# Patient Record
Sex: MALE | Race: ASIAN | URBAN - METROPOLITAN AREA
[De-identification: names, ages, dates, MRNs, and addresses within clinical notes are randomized per-mention and may not be internally consistent; named-entity substitution may affect disease eponyms.]

---

## 2020-07-13 ENCOUNTER — OFFICE VISIT (OUTPATIENT)
Dept: PRIMARY CARE CLINIC | Age: 40
End: 2020-07-13
Payer: COMMERCIAL

## 2020-07-13 PROCEDURE — G8428 CUR MEDS NOT DOCUMENT: HCPCS | Performed by: NURSE PRACTITIONER

## 2020-07-13 PROCEDURE — G8421 BMI NOT CALCULATED: HCPCS | Performed by: NURSE PRACTITIONER

## 2020-07-13 PROCEDURE — 99211 OFF/OP EST MAY X REQ PHY/QHP: CPT | Performed by: NURSE PRACTITIONER

## 2020-07-13 NOTE — PROGRESS NOTES
Wayne Aguilar received a viral test for COVID-19. They were educated on isolation and quarantine as appropriate. For any symptoms, they were directed to seek care from their PCP, given contact information to establish with a doctor, directed to an urgent care or the emergency room.

## 2020-07-18 LAB
SARS-COV-2: NOT DETECTED
SOURCE: NORMAL

## 2022-01-28 LAB — DIABETIC RETINOPATHY: NEGATIVE

## 2022-05-24 ENCOUNTER — OFFICE VISIT (OUTPATIENT)
Dept: PRIMARY CARE CLINIC | Age: 42
End: 2022-05-24
Payer: COMMERCIAL

## 2022-05-24 VITALS
OXYGEN SATURATION: 100 % | BODY MASS INDEX: 28.44 KG/M2 | HEIGHT: 67 IN | WEIGHT: 181.2 LBS | DIASTOLIC BLOOD PRESSURE: 86 MMHG | HEART RATE: 64 BPM | RESPIRATION RATE: 16 BRPM | SYSTOLIC BLOOD PRESSURE: 159 MMHG | TEMPERATURE: 97.1 F

## 2022-05-24 DIAGNOSIS — I10 PRIMARY HYPERTENSION: ICD-10-CM

## 2022-05-24 DIAGNOSIS — E11.9 TYPE 2 DIABETES MELLITUS WITHOUT COMPLICATION, WITHOUT LONG-TERM CURRENT USE OF INSULIN (HCC): ICD-10-CM

## 2022-05-24 DIAGNOSIS — E78.5 DYSLIPIDEMIA: ICD-10-CM

## 2022-05-24 DIAGNOSIS — E11.9 TYPE 2 DIABETES MELLITUS WITHOUT COMPLICATION, WITHOUT LONG-TERM CURRENT USE OF INSULIN (HCC): Primary | ICD-10-CM

## 2022-05-24 LAB
A/G RATIO: 1.7 (ref 1.1–2.2)
ALBUMIN SERPL-MCNC: 4.7 G/DL (ref 3.4–5)
ALP BLD-CCNC: 136 U/L (ref 40–129)
ALT SERPL-CCNC: 22 U/L (ref 10–40)
ANION GAP SERPL CALCULATED.3IONS-SCNC: 17 MMOL/L (ref 3–16)
AST SERPL-CCNC: 16 U/L (ref 15–37)
BASOPHILS ABSOLUTE: 0 K/UL (ref 0–0.2)
BASOPHILS RELATIVE PERCENT: 0.7 %
BILIRUB SERPL-MCNC: 0.6 MG/DL (ref 0–1)
BUN BLDV-MCNC: 5 MG/DL (ref 7–20)
CALCIUM SERPL-MCNC: 9.3 MG/DL (ref 8.3–10.6)
CHLORIDE BLD-SCNC: 99 MMOL/L (ref 99–110)
CHOLESTEROL, TOTAL: 145 MG/DL (ref 0–199)
CO2: 23 MMOL/L (ref 21–32)
CREAT SERPL-MCNC: 0.6 MG/DL (ref 0.9–1.3)
EOSINOPHILS ABSOLUTE: 0.1 K/UL (ref 0–0.6)
EOSINOPHILS RELATIVE PERCENT: 1.6 %
GFR AFRICAN AMERICAN: >60
GFR NON-AFRICAN AMERICAN: >60
GLUCOSE BLD-MCNC: 143 MG/DL (ref 70–99)
HCT VFR BLD CALC: 42.1 % (ref 40.5–52.5)
HDLC SERPL-MCNC: 40 MG/DL (ref 40–60)
HEMOGLOBIN: 14 G/DL (ref 13.5–17.5)
LDL CHOLESTEROL CALCULATED: 68 MG/DL
LYMPHOCYTES ABSOLUTE: 2.3 K/UL (ref 1–5.1)
LYMPHOCYTES RELATIVE PERCENT: 35.6 %
MCH RBC QN AUTO: 27.4 PG (ref 26–34)
MCHC RBC AUTO-ENTMCNC: 33.2 G/DL (ref 31–36)
MCV RBC AUTO: 82.6 FL (ref 80–100)
MONOCYTES ABSOLUTE: 0.4 K/UL (ref 0–1.3)
MONOCYTES RELATIVE PERCENT: 6.4 %
NEUTROPHILS ABSOLUTE: 3.6 K/UL (ref 1.7–7.7)
NEUTROPHILS RELATIVE PERCENT: 55.7 %
PDW BLD-RTO: 14.5 % (ref 12.4–15.4)
PLATELET # BLD: 300 K/UL (ref 135–450)
PMV BLD AUTO: 8 FL (ref 5–10.5)
POTASSIUM SERPL-SCNC: 4.6 MMOL/L (ref 3.5–5.1)
RBC # BLD: 5.09 M/UL (ref 4.2–5.9)
SODIUM BLD-SCNC: 139 MMOL/L (ref 136–145)
T4 FREE: 1.1 NG/DL (ref 0.9–1.8)
TOTAL PROTEIN: 7.5 G/DL (ref 6.4–8.2)
TRIGL SERPL-MCNC: 184 MG/DL (ref 0–150)
TSH REFLEX: 4.93 UIU/ML (ref 0.27–4.2)
VLDLC SERPL CALC-MCNC: 37 MG/DL
WBC # BLD: 6.5 K/UL (ref 4–11)

## 2022-05-24 PROCEDURE — 99203 OFFICE O/P NEW LOW 30 MIN: CPT | Performed by: FAMILY MEDICINE

## 2022-05-24 RX ORDER — ATORVASTATIN CALCIUM 10 MG/1
10 TABLET, FILM COATED ORAL DAILY
COMMUNITY
End: 2022-07-28

## 2022-05-24 RX ORDER — GLIMEPIRIDE 1 MG/1
1 TABLET ORAL 2 TIMES DAILY WITH MEALS
Qty: 60 TABLET | Refills: 2 | Status: SHIPPED | OUTPATIENT
Start: 2022-05-24 | End: 2022-07-28 | Stop reason: SDUPTHER

## 2022-05-24 RX ORDER — ATORVASTATIN CALCIUM 10 MG/1
10 TABLET, FILM COATED ORAL DAILY
Qty: 30 TABLET | Refills: 2 | Status: SHIPPED | OUTPATIENT
Start: 2022-05-24 | End: 2022-07-28 | Stop reason: SDUPTHER

## 2022-05-24 RX ORDER — TELMISARTAN 40 MG/1
40 TABLET ORAL DAILY
Qty: 30 TABLET | Refills: 2 | Status: SHIPPED | OUTPATIENT
Start: 2022-05-24 | End: 2022-07-28 | Stop reason: SDUPTHER

## 2022-05-24 RX ORDER — METOPROLOL SUCCINATE 25 MG/1
25 TABLET, EXTENDED RELEASE ORAL DAILY
Qty: 30 TABLET | Refills: 2 | Status: SHIPPED | OUTPATIENT
Start: 2022-05-24 | End: 2022-07-28 | Stop reason: SDUPTHER

## 2022-05-24 SDOH — ECONOMIC STABILITY: FOOD INSECURITY: WITHIN THE PAST 12 MONTHS, THE FOOD YOU BOUGHT JUST DIDN'T LAST AND YOU DIDN'T HAVE MONEY TO GET MORE.: NEVER TRUE

## 2022-05-24 SDOH — ECONOMIC STABILITY: FOOD INSECURITY: WITHIN THE PAST 12 MONTHS, YOU WORRIED THAT YOUR FOOD WOULD RUN OUT BEFORE YOU GOT MONEY TO BUY MORE.: NEVER TRUE

## 2022-05-24 ASSESSMENT — ENCOUNTER SYMPTOMS
VOMITING: 0
SORE THROAT: 0
ABDOMINAL PAIN: 0
BLOOD IN STOOL: 0
COUGH: 0
DIARRHEA: 0
SHORTNESS OF BREATH: 0
NAUSEA: 0

## 2022-05-24 ASSESSMENT — PATIENT HEALTH QUESTIONNAIRE - PHQ9
7. TROUBLE CONCENTRATING ON THINGS, SUCH AS READING THE NEWSPAPER OR WATCHING TELEVISION: 0
SUM OF ALL RESPONSES TO PHQ QUESTIONS 1-9: 0
4. FEELING TIRED OR HAVING LITTLE ENERGY: 0
SUM OF ALL RESPONSES TO PHQ9 QUESTIONS 1 & 2: 0
10. IF YOU CHECKED OFF ANY PROBLEMS, HOW DIFFICULT HAVE THESE PROBLEMS MADE IT FOR YOU TO DO YOUR WORK, TAKE CARE OF THINGS AT HOME, OR GET ALONG WITH OTHER PEOPLE: 0
SUM OF ALL RESPONSES TO PHQ QUESTIONS 1-9: 0
9. THOUGHTS THAT YOU WOULD BE BETTER OFF DEAD, OR OF HURTING YOURSELF: 0
6. FEELING BAD ABOUT YOURSELF - OR THAT YOU ARE A FAILURE OR HAVE LET YOURSELF OR YOUR FAMILY DOWN: 0
SUM OF ALL RESPONSES TO PHQ QUESTIONS 1-9: 0
1. LITTLE INTEREST OR PLEASURE IN DOING THINGS: 0
SUM OF ALL RESPONSES TO PHQ QUESTIONS 1-9: 0
5. POOR APPETITE OR OVEREATING: 0
3. TROUBLE FALLING OR STAYING ASLEEP: 0
8. MOVING OR SPEAKING SO SLOWLY THAT OTHER PEOPLE COULD HAVE NOTICED. OR THE OPPOSITE, BEING SO FIGETY OR RESTLESS THAT YOU HAVE BEEN MOVING AROUND A LOT MORE THAN USUAL: 0
2. FEELING DOWN, DEPRESSED OR HOPELESS: 0

## 2022-05-24 ASSESSMENT — SOCIAL DETERMINANTS OF HEALTH (SDOH): HOW HARD IS IT FOR YOU TO PAY FOR THE VERY BASICS LIKE FOOD, HOUSING, MEDICAL CARE, AND HEATING?: NOT HARD AT ALL

## 2022-05-24 NOTE — PATIENT INSTRUCTIONS
Patient Education      Go to the ER ASAP if you develop any chest pain, shortness of breath, facial droop, slurred speech, weakness/numbness in your arms or legs or double vision! DASH Diet: Care Instructions  Your Care Instructions     The DASH diet is an eating plan that can help lower your blood pressure. DASH stands for Dietary Approaches to Stop Hypertension. Hypertension is high bloodpressure. The DASH diet focuses on eating foods that are high in calcium, potassium, and magnesium. These nutrients can lower blood pressure. The foods that are highest in these nutrients are fruits, vegetables, low-fat dairy products, nuts, seeds, and legumes. But taking calcium, potassium, and magnesium supplements instead of eating foods that are high in those nutrients does not have the same effect. The DASH diet also includes whole grains, fish, and poultry. The DASH diet is one of several lifestyle changes your doctor may recommend to lower your high blood pressure. Your doctor may also want you to decrease the amount of sodium in your diet. Lowering sodium while following the DASH dietcan lower blood pressure even further than just the DASH diet alone. Follow-up care is a key part of your treatment and safety. Be sure to make and go to all appointments, and call your doctor if you are having problems. It's also a good idea to know your test results and keep alist of the medicines you take. How can you care for yourself at home? Following the DASH diet   Eat 4 to 5 servings of fruit each day. A serving is 1 medium-sized piece of fruit, ½ cup chopped or canned fruit, 1/4 cup dried fruit, or 4 ounces (½ cup) of fruit juice. Choose fruit more often than fruit juice.  Eat 4 to 5 servings of vegetables each day. A serving is 1 cup of lettuce or raw leafy vegetables, ½ cup of chopped or cooked vegetables, or 4 ounces (½ cup) of vegetable juice. Choose vegetables more often than vegetable juice.    Get 2 to 3 servings of low-fat and fat-free dairy each day. A serving is 8 ounces of milk, 1 cup of yogurt, or 1 ½ ounces of cheese.  Eat 6 to 8 servings of grains each day. A serving is 1 slice of bread, 1 ounce of dry cereal, or ½ cup of cooked rice, pasta, or cooked cereal. Try to choose whole-grain products as much as possible.  Limit lean meat, poultry, and fish to 2 servings each day. A serving is 3 ounces, about the size of a deck of cards.  Eat 4 to 5 servings of nuts, seeds, and legumes (cooked dried beans, lentils, and split peas) each week. A serving is 1/3 cup of nuts, 2 tablespoons of seeds, or ½ cup of cooked beans or peas.  Limit fats and oils to 2 to 3 servings each day. A serving is 1 teaspoon of vegetable oil or 2 tablespoons of salad dressing.  Limit sweets and added sugars to 5 servings or less a week. A serving is 1 tablespoon jelly or jam, ½ cup sorbet, or 1 cup of lemonade.  Eat less than 2,300 milligrams (mg) of sodium a day. If you limit your sodium to 1,500 mg a day, you can lower your blood pressure even more.  Be aware that all of these are the suggested number of servings for people who eat 1,800 to 2,000 calories a day. Your recommended number of servings may be different if you need more or fewer calories. Tips for success   Start small. Do not try to make dramatic changes to your diet all at once. You might feel that you are missing out on your favorite foods and then be more likely to not follow the plan. Make small changes, and stick with them. Once those changes become habit, add a few more changes.  Try some of the following:  ? Make it a goal to eat a fruit or vegetable at every meal and at snacks. This will make it easy to get the recommended amount of fruits and vegetables each day. ? Try yogurt topped with fruit and nuts for a snack or healthy dessert. ? Add lettuce, tomato, cucumber, and onion to sandwiches.   ? Combine a ready-made pizza crust with low-fat mozzarella cheese and lots of vegetable toppings. Try using tomatoes, squash, spinach, broccoli, carrots, cauliflower, and onions. ? Have a variety of cut-up vegetables with a low-fat dip as an appetizer instead of chips and dip. ? Sprinkle sunflower seeds or chopped almonds over salads. Or try adding chopped walnuts or almonds to cooked vegetables. ? Try some vegetarian meals using beans and peas. Add garbanzo or kidney beans to salads. Make burritos and tacos with mashed dorsey beans or black beans. Where can you learn more? Go to https://Viewglasseb.[employer]. org and sign in to your [employer] account. Enter I520 in the [employer] box to learn more about \"DASH Diet: Care Instructions. \"     If you do not have an account, please click on the \"Sign Up Now\" link. Current as of: January 10, 2022               Content Version: 13.2  © 2006-2022 Healthwise, Incorporated. Care instructions adapted under license by [facility] ([facility]). If you have questions about a medical condition or this instruction, always ask your healthcare professional. [address] any warranty or liability for your use of this information.

## 2022-05-24 NOTE — PROGRESS NOTES
Chief Complaint   Patient presents with   [address]    Annual Exam         Mandi Anand is a 39 y.o. male who presents to be established. Pt just returned from a stay in [facility] and saw a physician there and had his medications adjusted as his HgBA1c level was elevated at 7.6    Patient has a past medical history significant for HTN and dyslipidemia is on toprol xl 25 mg/micardis 40 mg daily and lipitor 10 mg daily     Pt has a hx of diabetes and is on metformin 500 mg/amaryl 0.5 mg bid and farxiga 10 mg daily and reports FBS readings between 136 - 140    Pt denies any hx of CAD or asthma and denies any surgeries    Pt is a nonsmoker and denies any alcohol or illegal drug use; Pt does report recent family stress issues; Pt does eat alot of salty and spicy foods    Pt did complete his COVID vaccine series    FHx positive for early CAD (father); FHx negative for colon/prostate cancer      Review of Systems   Constitutional: Negative for fatigue and fever. HENT: Negative for nosebleeds and sore throat. Eyes:        Negative blurred vision or diplopia   Respiratory: Negative for cough and shortness of breath. Cardiovascular: Negative for chest pain, palpitations and leg swelling. Gastrointestinal: Negative for abdominal pain, blood in stool, diarrhea, nausea and vomiting. Negative melena or indigestion   Endocrine: Negative for polydipsia and polyuria. Genitourinary: Negative for dysuria and hematuria. Musculoskeletal: Negative for arthralgias. Skin: Negative for rash. Neurological: Negative for dizziness, seizures, syncope, speech difficulty, weakness and headaches. Psychiatric/Behavioral: Negative for dysphoric mood. The patient is not nervous/anxious. Vitals:    05/24/22 0755   BP: (!) 159/86   Pulse: 64   Resp: 16   Temp: 97.1 °F (36.2 °C)   SpO2: 100%     Pt did not take his BP medication yet today    Physical Exam  Vitals reviewed.    Constitutional: General: He is not in acute distress. HENT:      Mouth/Throat:      Mouth: Mucous membranes are moist.      Pharynx: Oropharynx is clear. Eyes:      General: No scleral icterus. Comments: Pink conjunctivae    Neck:      Thyroid: No thyromegaly. Comments: No carotid bruits noted B/L  Cardiovascular:      Heart sounds: Normal heart sounds. No murmur heard. No friction rub. No gallop. Pulmonary:      Effort: Pulmonary effort is normal.      Breath sounds: Normal breath sounds. Abdominal:      Palpations: Abdomen is soft. Tenderness: There is no abdominal tenderness. Musculoskeletal:      Comments: No leg edema noted B/L   Lymphadenopathy:      Cervical: No cervical adenopathy. Skin:     Findings: No rash. Neurological:      Mental Status: He is alert. Comments: Cranial nerves 2 - 12 grossly intact; Muscle strength 5/5 throughout   Psychiatric:         Mood and Affect: Mood normal.         ASSESSMENT AND PLAN    1. Type 2 diabetes mellitus without complication, without long-term current use of insulin (HCC)  Patient clinically stable on present medications and was told to follow a diabetic diet; avoiding concentrated sweets and consuming a low carbohydrate diet and will check fasting bloodwork  - CBC with Auto Differential; Future  - Comprehensive Metabolic Panel; Future  - TSH with Reflex; Future  - Lipid Panel; Future  - Hemoglobin A1C; Future  - metFORMIN (GLUCOPHAGE) 500 MG tablet; Take 1 tablet by mouth 2 times daily (with meals)  Dispense: 60 tablet; Refill: 2  - glimepiride (AMARYL) 1 MG tablet; Take 1 tablet by mouth 2 times daily (with meals)  Dispense: 60 tablet; Refill: 2    2. Primary hypertension  Pt with an elevated /86 but denies any CP or SOB and was told to follow a low sodium diet and will reevaluate pt in 6 weeks  - CBC with Auto Differential; Future  - Comprehensive Metabolic Panel; Future  - TSH with Reflex; Future  - Lipid Panel;  Future  - metoprolol succinate (TOPROL XL) 25 MG extended release tablet; Take 1 tablet by mouth daily  Dispense: 30 tablet; Refill: 2  - telmisartan (MICARDIS) 40 MG tablet; Take 1 tablet by mouth daily  Dispense: 30 tablet; Refill: 2    3. Dyslipidemia  Patient clinically stable on present medications and is with a nonfocal neuro exam  - CBC with Auto Differential; Future  - Comprehensive Metabolic Panel; Future  - TSH with Reflex; Future  - Lipid Panel; Future  - atorvastatin (LIPITOR) 10 MG tablet; Take 1 tablet by mouth daily  Dispense: 30 tablet; Refill: 2        Vee Longoria MD      Return in about 6 weeks (around 7/5/2022). Patient Instructions     Patient Education      Go to the ER ASAP if you develop any chest pain, shortness of breath, facial droop, slurred speech, weakness/numbness in your arms or legs or double vision! DASH Diet: Care Instructions  Your Care Instructions     The DASH diet is an eating plan that can help lower your blood pressure. DASH stands for Dietary Approaches to Stop Hypertension. Hypertension is high bloodpressure. The DASH diet focuses on eating foods that are high in calcium, potassium, and magnesium. These nutrients can lower blood pressure. The foods that are highest in these nutrients are fruits, vegetables, low-fat dairy products, nuts, seeds, and legumes. But taking calcium, potassium, and magnesium supplements instead of eating foods that are high in those nutrients does not have the same effect. The DASH diet also includes whole grains, fish, and poultry. The DASH diet is one of several lifestyle changes your doctor may recommend to lower your high blood pressure. Your doctor may also want you to decrease the amount of sodium in your diet. Lowering sodium while following the DASH dietcan lower blood pressure even further than just the DASH diet alone. Follow-up care is a key part of your treatment and safety.  Be sure to make and go to all appointments, and call your doctor if you are having problems. It's also a good idea to know your test results and keep alist of the medicines you take. How can you care for yourself at home? Following the DASH diet   Eat 4 to 5 servings of fruit each day. A serving is 1 medium-sized piece of fruit, ½ cup chopped or canned fruit, 1/4 cup dried fruit, or 4 ounces (½ cup) of fruit juice. Choose fruit more often than fruit juice.  Eat 4 to 5 servings of vegetables each day. A serving is 1 cup of lettuce or raw leafy vegetables, ½ cup of chopped or cooked vegetables, or 4 ounces (½ cup) of vegetable juice. Choose vegetables more often than vegetable juice.  Get 2 to 3 servings of low-fat and fat-free dairy each day. A serving is 8 ounces of milk, 1 cup of yogurt, or 1 ½ ounces of cheese.  Eat 6 to 8 servings of grains each day. A serving is 1 slice of bread, 1 ounce of dry cereal, or ½ cup of cooked rice, pasta, or cooked cereal. Try to choose whole-grain products as much as possible.  Limit lean meat, poultry, and fish to 2 servings each day. A serving is 3 ounces, about the size of a deck of cards.  Eat 4 to 5 servings of nuts, seeds, and legumes (cooked dried beans, lentils, and split peas) each week. A serving is 1/3 cup of nuts, 2 tablespoons of seeds, or ½ cup of cooked beans or peas.  Limit fats and oils to 2 to 3 servings each day. A serving is 1 teaspoon of vegetable oil or 2 tablespoons of salad dressing.  Limit sweets and added sugars to 5 servings or less a week. A serving is 1 tablespoon jelly or jam, ½ cup sorbet, or 1 cup of lemonade.  Eat less than 2,300 milligrams (mg) of sodium a day. If you limit your sodium to 1,500 mg a day, you can lower your blood pressure even more.  Be aware that all of these are the suggested number of servings for people who eat 1,800 to 2,000 calories a day. Your recommended number of servings may be different if you need more or fewer calories. Tips for success   Start small.  Do not try to make dramatic changes to your diet all at once. You might feel that you are missing out on your favorite foods and then be more likely to not follow the plan. Make small changes, and stick with them. Once those changes become habit, add a few more changes.  Try some of the following:  ? Make it a goal to eat a fruit or vegetable at every meal and at snacks. This will make it easy to get the recommended amount of fruits and vegetables each day. ? Try yogurt topped with fruit and nuts for a snack or healthy dessert. ? Add lettuce, tomato, cucumber, and onion to sandwiches. ? Combine a ready-made pizza crust with low-fat mozzarella cheese and lots of vegetable toppings. Try using tomatoes, squash, spinach, broccoli, carrots, cauliflower, and onions. ? Have a variety of cut-up vegetables with a low-fat dip as an appetizer instead of chips and dip. ? Sprinkle sunflower seeds or chopped almonds over salads. Or try adding chopped walnuts or almonds to cooked vegetables. ? Try some vegetarian meals using beans and peas. Add garbanzo or kidney beans to salads. Make burritos and tacos with mashed dorsey beans or black beans. Where can you learn more? Go to https://Mango HealtholiverSolulink.[employer]. org and sign in to your [employer] account. Enter N281 in the [facility] box to learn more about \"DASH Diet: Care Instructions. \"     If you do not have an account, please click on the \"Sign Up Now\" link. Current as of: January 10, 2022               Content Version: 13.2  © 8261-8342 Healthwise, [employer]. Care instructions adapted under license by [facility] ([facility]). If you have questions about a medical condition or this instruction, always ask your healthcare professional. [address] any warranty or liability for your use of this information.

## 2022-05-25 LAB
ESTIMATED AVERAGE GLUCOSE: 188.6 MG/DL
HBA1C MFR BLD: 8.2 %

## 2022-07-28 ENCOUNTER — OFFICE VISIT (OUTPATIENT)
Dept: PRIMARY CARE CLINIC | Age: 42
End: 2022-07-28
Payer: COMMERCIAL

## 2022-07-28 VITALS
WEIGHT: 179 LBS | RESPIRATION RATE: 16 BRPM | DIASTOLIC BLOOD PRESSURE: 70 MMHG | SYSTOLIC BLOOD PRESSURE: 122 MMHG | BODY MASS INDEX: 28.09 KG/M2 | TEMPERATURE: 98.6 F | HEART RATE: 81 BPM | HEIGHT: 67 IN | OXYGEN SATURATION: 97 %

## 2022-07-28 DIAGNOSIS — I10 PRIMARY HYPERTENSION: ICD-10-CM

## 2022-07-28 DIAGNOSIS — E11.9 TYPE 2 DIABETES MELLITUS WITHOUT COMPLICATION, WITHOUT LONG-TERM CURRENT USE OF INSULIN (HCC): ICD-10-CM

## 2022-07-28 DIAGNOSIS — E78.5 DYSLIPIDEMIA: ICD-10-CM

## 2022-07-28 DIAGNOSIS — R09.89 BRUIT OF LEFT CAROTID ARTERY: Primary | ICD-10-CM

## 2022-07-28 PROCEDURE — 3052F HG A1C>EQUAL 8.0%<EQUAL 9.0%: CPT | Performed by: FAMILY MEDICINE

## 2022-07-28 PROCEDURE — 99214 OFFICE O/P EST MOD 30 MIN: CPT | Performed by: FAMILY MEDICINE

## 2022-07-28 RX ORDER — TELMISARTAN 40 MG/1
40 TABLET ORAL DAILY
Qty: 30 TABLET | Refills: 2 | Status: SHIPPED | OUTPATIENT
Start: 2022-07-28 | End: 2022-11-01

## 2022-07-28 RX ORDER — METOPROLOL SUCCINATE 25 MG/1
25 TABLET, EXTENDED RELEASE ORAL DAILY
Qty: 30 TABLET | Refills: 2 | Status: SHIPPED | OUTPATIENT
Start: 2022-07-28 | End: 2022-11-01

## 2022-07-28 RX ORDER — ATORVASTATIN CALCIUM 10 MG/1
10 TABLET, FILM COATED ORAL DAILY
Qty: 30 TABLET | Refills: 2 | Status: SHIPPED | OUTPATIENT
Start: 2022-07-28 | End: 2022-11-01

## 2022-07-28 RX ORDER — GLIMEPIRIDE 1 MG/1
1 TABLET ORAL 2 TIMES DAILY WITH MEALS
Qty: 60 TABLET | Refills: 2 | Status: SHIPPED | OUTPATIENT
Start: 2022-07-28 | End: 2022-11-01

## 2022-07-28 ASSESSMENT — ENCOUNTER SYMPTOMS
VOMITING: 0
SORE THROAT: 0
NAUSEA: 0
COUGH: 0
ABDOMINAL PAIN: 0
SHORTNESS OF BREATH: 0
DIARRHEA: 0
BLOOD IN STOOL: 0

## 2022-07-28 NOTE — PROGRESS NOTES
Chief Complaint   Patient presents with    Follow-up     Patient here for follow up visit-     Back Pain     C/o lower back pain X two days         Vega William is a 43 y.o. male who presents for followup visit regarding HTN, dyslipidemia and diabetes. Pt has been following a low sodium diet and reports BP readings at home  around 130/ 80     Patient has a past medical history significant for HTN and dyslipidemia is on toprol xl 25 mg micardis 40 mg daily and lipitor 10 mg daily     Pt has a hx of diabetes and is on metformin and amaryl daily and reports FBS readings between  136 - 140  Pt did see Ophthalmology and reports a normal eye exam    Patient did recent bloodwork [CMP, CBC, TSH, lipid panel] which was within normal limits except for signs of uncontrolled diabetes with a HgBA1c 8.2 and patient thus needs to follow a stricter diabetic diet; avoiding concentrated sweets and consuming a low carbohydrate diet. Patient also is with a mildly elevated triglyceride \"fat\" level  184 and needs to follow a low fat diet. Finally patient is with a mildly elevated thyroid TSH level 4.93 (with nl free T4) and alk phos 136 which we will follow     Pt denies any hx of CAD or asthma and denies any surgeries     Pt is a nonsmoker and denies any alcohol or illegal drug use     Pt did complete his COVID vaccine series     FHx positive for early CAD (father); FHx negative for colon/prostate cancer      Review of Systems   Constitutional:  Negative for fatigue and fever. HENT:  Negative for nosebleeds and sore throat. Eyes:         Negative blurred vision or diplopia   Respiratory:  Negative for cough and shortness of breath. Cardiovascular:  Negative for chest pain, palpitations and leg swelling. Gastrointestinal:  Negative for abdominal pain, blood in stool, diarrhea, nausea and vomiting. Negative melena or indigestion   Endocrine: Negative for polydipsia and polyuria.    Genitourinary:  Negative for dysuria and hematuria. Musculoskeletal:  Negative for arthralgias. Skin:  Negative for rash. Neurological:  Negative for dizziness, seizures, syncope, speech difficulty, weakness and headaches. Psychiatric/Behavioral:  Negative for dysphoric mood. The patient is not nervous/anxious. Vitals:    07/28/22 1510   BP: 122/70   Pulse: 81   Resp: 16   Temp: 98.6 °F (37 °C)   SpO2: 97%         Physical Exam  Vitals reviewed. Constitutional:       General: He is not in acute distress. HENT:      Mouth/Throat:      Mouth: Mucous membranes are moist.      Pharynx: Oropharynx is clear. Eyes:      General: No scleral icterus. Comments: Pink conjunctivae    Neck:      Thyroid: No thyromegaly. Comments: Positive L carotid bruit noted  Cardiovascular:      Heart sounds: Normal heart sounds. No murmur heard. No friction rub. No gallop. Pulmonary:      Effort: Pulmonary effort is normal.      Breath sounds: Normal breath sounds. Abdominal:      Palpations: Abdomen is soft. Tenderness: There is no abdominal tenderness. Musculoskeletal:      Comments: No leg edema noted B/L   Lymphadenopathy:      Cervical: No cervical adenopathy. Skin:     Findings: No rash. Neurological:      Mental Status: He is alert. Comments: Cranial nerves 2 - 12 grossly intact; Muscle strength 5/5 throughout   Psychiatric:         Mood and Affect: Mood normal.       ASSESSMENT AND PLAN    1. Type 2 diabetes mellitus without complication, without long-term current use of insulin ([facility])  Patient clinically stable on present medications and was told to follow a diabetic diet; avoiding concentrated sweets and consuming a low carbohydrate diet  - metFORMIN (GLUCOPHAGE) 500 MG tablet; Take 1 tablet by mouth in the morning and 1 tablet in the evening. Take with meals. Dispense: 60 tablet; Refill: 2  - glimepiride (AMARYL) 1 MG tablet; Take 1 tablet by mouth in the morning and 1 tablet in the evening. Take with meals. Dispense: 60 tablet; Refill: 2    2. Primary hypertension  Patient clinically stable on present medications and denies any CP or SOB and had recent normal CMP bloodwork  - metoprolol succinate (TOPROL XL) 25 MG extended release tablet; Take 1 tablet by mouth in the morning. Dispense: 30 tablet; Refill: 2  - telmisartan (MICARDIS) 40 MG tablet; Take 1 tablet by mouth in the morning. Dispense: 30 tablet; Refill: 2    3. Dyslipidemia/Bruit of left carotid artery  Patient clinically stable on present medications and is with a nonfocal neuro exam and will check carotid dopplers given noted L carotid bruit on PE  - atorvastatin (LIPITOR) 10 MG tablet; Take 1 tablet by mouth in the morning. Dispense: 30 tablet; Refill: 2  - VL Extremity Venous Bilateral; Future      Georgetta Siemens, MD      Return in about 3 months (around 10/28/2022). Patient Instructions     Go to the ER ASAP if you develop any chest pain, shortness of breath, facial droop, slurred speech, weakness/numbness in your arms or legs or double vision!

## 2022-10-31 DIAGNOSIS — I10 PRIMARY HYPERTENSION: ICD-10-CM

## 2022-10-31 DIAGNOSIS — E11.9 TYPE 2 DIABETES MELLITUS WITHOUT COMPLICATION, WITHOUT LONG-TERM CURRENT USE OF INSULIN (HCC): ICD-10-CM

## 2022-10-31 DIAGNOSIS — E78.5 DYSLIPIDEMIA: ICD-10-CM

## 2022-11-01 RX ORDER — METOPROLOL SUCCINATE 25 MG/1
25 TABLET, EXTENDED RELEASE ORAL DAILY
Qty: 30 TABLET | Refills: 1 | Status: SHIPPED | OUTPATIENT
Start: 2022-11-01

## 2022-11-01 RX ORDER — TELMISARTAN 40 MG/1
40 TABLET ORAL DAILY
Qty: 30 TABLET | Refills: 1 | Status: SHIPPED | OUTPATIENT
Start: 2022-11-01

## 2022-11-01 RX ORDER — ATORVASTATIN CALCIUM 10 MG/1
10 TABLET, FILM COATED ORAL DAILY
Qty: 30 TABLET | Refills: 1 | Status: SHIPPED | OUTPATIENT
Start: 2022-11-01

## 2022-11-01 RX ORDER — GLIMEPIRIDE 1 MG/1
TABLET ORAL
Qty: 60 TABLET | Refills: 1 | Status: SHIPPED | OUTPATIENT
Start: 2022-11-01

## 2022-11-01 NOTE — TELEPHONE ENCOUNTER
Medication:   Requested Prescriptions     Pending Prescriptions Disp Refills    atorvastatin (LIPITOR) 10 MG tablet [Pharmacy Med Name: Atorvastatin Calcium 10 MG Oral Tablet] 30 tablet 0     Sig: TAKE 1 TABLET BY MOUTH IN THE MORNING    glimepiride (AMARYL) 1 MG tablet [Pharmacy Med Name: Glimepiride 1 MG Oral Tablet] 60 tablet 0     Sig: TAKE 1 TABLET BY MOUTH IN THE MORNING AND 1 IN THE EVENING WITH MEALS    metFORMIN (GLUCOPHAGE) 500 MG tablet [Pharmacy Med Name: metFORMIN HCl 500 MG Oral Tablet] 60 tablet 0     Sig: TAKE 1 TABLET BY MOUTH IN THE MORNING AND 1 IN THE EVENING WITH MEALS    metoprolol succinate (TOPROL XL) 25 MG extended release tablet [Pharmacy Med Name: Metoprolol Succinate ER 25 MG Oral Tablet Extended Release 24 Hour] 30 tablet 0     Sig: TAKE 1 TABLET BY MOUTH IN THE MORNING    telmisartan (MICARDIS) 40 MG tablet [Pharmacy Med Name: Telmisartan 40 MG Oral Tablet] 30 tablet 0     Sig: TAKE 1 TABLET BY MOUTH IN THE MORNING       Last Filled:  Atorvastatin: 07/28/2022 #30 with 2 refills  Glimepiride: 07/28/2022 #60 with 2 refills  Metformin: 07/28/2022 #60 with 2 refills  Metoprolol: 07/28/2022 #30 with 2 refills  Telmisartan: 07/28/2022 #30 with 2 refills     Patient Phone Number: [phone] (home)     Last appt: 7/28/2022   Next appt: Visit date not found    Last Labs DM:   Lab Results   Component Value Date/Time    LABA1C 8.2 05/24/2022 08:51 AM     Last Lipid:   Lab Results   Component Value Date/Time    CHOL 145 05/24/2022 08:51 AM    TRIG 184 05/24/2022 08:51 AM    HDL 40 05/24/2022 08:51 AM    LDLCALC 68 05/24/2022 08:51 AM     Last PSA: No results found for: PSA  Last Thyroid:   Lab Results   Component Value Date/Time    T4FREE 1.1 05/24/2022 08:51 AM

## 2023-01-17 DIAGNOSIS — I10 PRIMARY HYPERTENSION: ICD-10-CM

## 2023-01-17 DIAGNOSIS — E78.5 DYSLIPIDEMIA: ICD-10-CM

## 2023-01-17 DIAGNOSIS — E11.9 TYPE 2 DIABETES MELLITUS WITHOUT COMPLICATION, WITHOUT LONG-TERM CURRENT USE OF INSULIN (HCC): ICD-10-CM

## 2023-01-17 RX ORDER — METOPROLOL SUCCINATE 25 MG/1
TABLET, EXTENDED RELEASE ORAL
Qty: 30 TABLET | Refills: 1 | Status: SHIPPED | OUTPATIENT
Start: 2023-01-17

## 2023-01-17 RX ORDER — ATORVASTATIN CALCIUM 10 MG/1
TABLET, FILM COATED ORAL
Qty: 30 TABLET | Refills: 1 | Status: SHIPPED | OUTPATIENT
Start: 2023-01-17

## 2023-01-17 RX ORDER — GLIMEPIRIDE 1 MG/1
TABLET ORAL
Qty: 60 TABLET | Refills: 1 | Status: SHIPPED | OUTPATIENT
Start: 2023-01-17

## 2023-01-17 RX ORDER — TELMISARTAN 40 MG/1
TABLET ORAL
Qty: 30 TABLET | Refills: 1 | Status: SHIPPED | OUTPATIENT
Start: 2023-01-17

## 2023-01-17 NOTE — TELEPHONE ENCOUNTER
Medication:   Requested Prescriptions     Pending Prescriptions Disp Refills    metFORMIN (GLUCOPHAGE) 500 MG tablet [Pharmacy Med Name: metFORMIN HCl 500 MG Oral Tablet] 60 tablet 0     Sig: TAKE 1 TABLET BY MOUTH TWICE DAILY WITH MEALS    metoprolol succinate (TOPROL XL) 25 MG extended release tablet [Pharmacy Med Name: Metoprolol Succinate ER 25 MG Oral Tablet Extended Release 24 Hour] 30 tablet 0     Sig: Take 1 tablet by mouth once daily    glimepiride (AMARYL) 1 MG tablet [Pharmacy Med Name: Glimepiride 1 MG Oral Tablet] 60 tablet 0     Sig: TAKE 1 TABLET BY MOUTH TWICE DAILY WITH MEALS    atorvastatin (LIPITOR) 10 MG tablet [Pharmacy Med Name: Atorvastatin Calcium 10 MG Oral Tablet] 30 tablet 0     Sig: Take 1 tablet by mouth once daily    telmisartan (MICARDIS) 40 MG tablet [Pharmacy Med Name: Telmisartan 40 MG Oral Tablet] 30 tablet 0     Sig: Take 1 tablet by mouth once daily     Last Filled:  11.1.22 11.1.22 11.1.22 11.1.22    Last appt: 5/24/2022   Next appt: Visit date not found    Last Labs DM:   Lab Results   Component Value Date/Time    LABA1C 8.2 05/24/2022 08:51 AM     Last Lipid:   Lab Results   Component Value Date/Time    CHOL 145 05/24/2022 08:51 AM    TRIG 184 05/24/2022 08:51 AM    HDL 40 05/24/2022 08:51 AM    LDLCALC 68 05/24/2022 08:51 AM     Last PSA: No results found for: PSA  Last Thyroid:   Lab Results   Component Value Date/Time    T4FREE 1.1 05/24/2022 08:51 AM

## 2023-01-17 NOTE — TELEPHONE ENCOUNTER
Attempted to call patient to schedule an appointment. There was no answer. Left VM to call back.  1/17/2023

## 2023-01-18 NOTE — TELEPHONE ENCOUNTER
Attempted to call patient a 2nd time to schedule for an appointment, there was no answer again.  Left a 2nd VM. 1/18/23 CZ

## 2023-01-20 NOTE — TELEPHONE ENCOUNTER
I called the patient this morning, and he answered. He said he will need to check his schedule and then will call us back to make an appointment.  1/20 CZ

## 2023-01-31 ENCOUNTER — OFFICE VISIT (OUTPATIENT)
Dept: PRIMARY CARE CLINIC | Age: 43
End: 2023-01-31
Payer: COMMERCIAL

## 2023-01-31 VITALS
OXYGEN SATURATION: 99 % | HEART RATE: 69 BPM | SYSTOLIC BLOOD PRESSURE: 132 MMHG | TEMPERATURE: 96 F | RESPIRATION RATE: 16 BRPM | BODY MASS INDEX: 28.41 KG/M2 | HEIGHT: 67 IN | WEIGHT: 181 LBS | DIASTOLIC BLOOD PRESSURE: 84 MMHG

## 2023-01-31 DIAGNOSIS — E78.5 DYSLIPIDEMIA: ICD-10-CM

## 2023-01-31 DIAGNOSIS — E11.9 TYPE 2 DIABETES MELLITUS WITHOUT COMPLICATION, WITHOUT LONG-TERM CURRENT USE OF INSULIN (HCC): ICD-10-CM

## 2023-01-31 DIAGNOSIS — R09.89 BRUIT OF LEFT CAROTID ARTERY: ICD-10-CM

## 2023-01-31 DIAGNOSIS — I10 PRIMARY HYPERTENSION: ICD-10-CM

## 2023-01-31 DIAGNOSIS — I10 PRIMARY HYPERTENSION: Primary | ICD-10-CM

## 2023-01-31 LAB
A/G RATIO: 1.7 (ref 1.1–2.2)
ALBUMIN SERPL-MCNC: 4.2 G/DL (ref 3.4–5)
ALP BLD-CCNC: 134 U/L (ref 40–129)
ALT SERPL-CCNC: 17 U/L (ref 10–40)
ANION GAP SERPL CALCULATED.3IONS-SCNC: 8 MMOL/L (ref 3–16)
AST SERPL-CCNC: 14 U/L (ref 15–37)
BILIRUB SERPL-MCNC: 0.6 MG/DL (ref 0–1)
BUN BLDV-MCNC: 6 MG/DL (ref 7–20)
CALCIUM SERPL-MCNC: 9.1 MG/DL (ref 8.3–10.6)
CHLORIDE BLD-SCNC: 102 MMOL/L (ref 99–110)
CO2: 28 MMOL/L (ref 21–32)
CREAT SERPL-MCNC: 0.5 MG/DL (ref 0.9–1.3)
ESTIMATED AVERAGE GLUCOSE: 165.7 MG/DL
GFR SERPL CREATININE-BSD FRML MDRD: >60 ML/MIN/{1.73_M2}
GLUCOSE BLD-MCNC: 156 MG/DL (ref 70–99)
HBA1C MFR BLD: 7.4 %
POTASSIUM SERPL-SCNC: 4.6 MMOL/L (ref 3.5–5.1)
SODIUM BLD-SCNC: 138 MMOL/L (ref 136–145)
T4 FREE: 1.1 NG/DL (ref 0.9–1.8)
TOTAL PROTEIN: 6.7 G/DL (ref 6.4–8.2)
TSH REFLEX: 5.08 UIU/ML (ref 0.27–4.2)

## 2023-01-31 PROCEDURE — 3078F DIAST BP <80 MM HG: CPT | Performed by: FAMILY MEDICINE

## 2023-01-31 PROCEDURE — 3074F SYST BP LT 130 MM HG: CPT | Performed by: FAMILY MEDICINE

## 2023-01-31 PROCEDURE — 3051F HG A1C>EQUAL 7.0%<8.0%: CPT | Performed by: FAMILY MEDICINE

## 2023-01-31 PROCEDURE — 99214 OFFICE O/P EST MOD 30 MIN: CPT | Performed by: FAMILY MEDICINE

## 2023-01-31 ASSESSMENT — PATIENT HEALTH QUESTIONNAIRE - PHQ9
SUM OF ALL RESPONSES TO PHQ QUESTIONS 1-9: 0
SUM OF ALL RESPONSES TO PHQ QUESTIONS 1-9: 0
2. FEELING DOWN, DEPRESSED OR HOPELESS: 0
SUM OF ALL RESPONSES TO PHQ QUESTIONS 1-9: 0
SUM OF ALL RESPONSES TO PHQ9 QUESTIONS 1 & 2: 0
1. LITTLE INTEREST OR PLEASURE IN DOING THINGS: 0
SUM OF ALL RESPONSES TO PHQ QUESTIONS 1-9: 0

## 2023-01-31 ASSESSMENT — ENCOUNTER SYMPTOMS
VOMITING: 0
COUGH: 0
BLOOD IN STOOL: 0
SORE THROAT: 0
DIARRHEA: 0
NAUSEA: 0
SHORTNESS OF BREATH: 0
ABDOMINAL PAIN: 0

## 2023-01-31 NOTE — PROGRESS NOTES
Chief Complaint   Patient presents with    Diabetes     Pt is here for a diabetes f/u          Terry Sanz is a 43 y.o. male who presents for followup visit regarding HTN, dyslipidemia and diabetes. Pt has been following a low sodium diet and reports BP readings at home around 135/80  Pt did not do carotid doppler study as directed     Patient has a past medical history significant for HTN and dyslipidemia is on toprol xl 25 mg, micardis 40 mg daily and lipitor 10 mg daily     Pt has a hx of diabetes and is on metformin, farxiga and amaryl daily and has been following a diabetic diet and has been walking 7,000 steps per day and reports FBS readings between 138 - 140  Pt did see Ophthalmology and reports a normal eye exam     Patient did recent bloodwork [CMP, CBC, TSH, lipid panel] which was within normal limits except for signs of uncontrolled diabetes with a HgBA1c 8.2 and patient thus needs to follow a stricter diabetic diet; avoiding concentrated sweets and consuming a low carbohydrate diet. Patient also is with a mildly elevated triglyceride \"fat\" level  184 and needs to follow a low fat diet. Finally patient is with a mildly elevated thyroid TSH level 4.93 (with nl free T4) and alk phos 136 which we will follow      Pt denies any hx of CAD or asthma and denies any surgeries     Pt is a nonsmoker and denies any alcohol or illegal drug use     Pt did complete his COVID vaccine series     FHx positive for early CAD (father); FHx negative for colon/prostate cancer      Review of Systems   Constitutional:  Negative for fatigue and fever. HENT:  Negative for nosebleeds and sore throat. Eyes:         Negative blurred vision or diplopia   Respiratory:  Negative for cough and shortness of breath. Cardiovascular:  Negative for chest pain, palpitations and leg swelling. Gastrointestinal:  Negative for abdominal pain, blood in stool, diarrhea, nausea and vomiting.         Negative melena or indigestion Endocrine: Negative for polydipsia and polyuria. Genitourinary:  Negative for dysuria and hematuria. Musculoskeletal:  Negative for arthralgias. Skin:  Negative for rash. Neurological:  Negative for dizziness, seizures, syncope, speech difficulty, weakness and headaches. Psychiatric/Behavioral:  Negative for dysphoric mood. The patient is not nervous/anxious. Vitals:    01/31/23 0922   BP: 132/84   Pulse:    Resp:    Temp:    SpO2:          Physical Exam  Vitals reviewed. Constitutional:       General: He is not in acute distress. HENT:      Mouth/Throat:      Mouth: Mucous membranes are moist.      Pharynx: Oropharynx is clear. Eyes:      General: No scleral icterus. Comments: Pink conjunctivae    Neck:      Thyroid: No thyromegaly. Comments: Positive L carotid bruit noted  Cardiovascular:      Heart sounds: Normal heart sounds. No murmur heard. No friction rub. No gallop. Pulmonary:      Effort: Pulmonary effort is normal.      Breath sounds: Normal breath sounds. Abdominal:      Palpations: Abdomen is soft. Tenderness: There is no abdominal tenderness. Musculoskeletal:      Comments: No leg edema noted B/L   Lymphadenopathy:      Cervical: No cervical adenopathy. Skin:     Findings: No rash. Neurological:      Mental Status: He is alert. Comments: Cranial nerves 2 - 12 grossly intact; Muscle strength 5/5 throughout   Psychiatric:         Mood and Affect: Mood normal.       ASSESSMENT AND PLAN    1. Primary hypertension  Patient clinically stable on present medications and denies any CP or SOB   - Comprehensive Metabolic Panel; Future  - TSH with Reflex; Future    2.  Type 2 diabetes mellitus without complication, without long-term current use of insulin ([facility] [address]  Patient clinically stable on present medications and was told to follow a diabetic diet; avoiding concentrated sweets and consuming a low carbohydrate diet and will check basic bloodwork  - Comprehensive Metabolic Panel; Future  - Hemoglobin A1C; Future  - TSH with Reflex; Future    3. Dyslipidemia/Bruit of left carotid artery  Patient clinically stable on present medications and is with a nonfocal neuro exam and was told to do carotid dopplers as directed! - VL DUP CAROTID BILATERAL; Future  - Comprehensive Metabolic Panel; Future  - TSH with Reflex; Future        Carlos Anderson MD      Return in about 3 months (around 4/30/2023). Patient Instructions     Go to the ER ASAP if you develop any chest pain, shortness of breath, facial droop, slurred speech, weakness/numbness in your arms or legs or double vision!

## 2023-03-29 DIAGNOSIS — E11.9 TYPE 2 DIABETES MELLITUS WITHOUT COMPLICATION, WITHOUT LONG-TERM CURRENT USE OF INSULIN (HCC): ICD-10-CM

## 2023-03-29 DIAGNOSIS — E78.5 DYSLIPIDEMIA: ICD-10-CM

## 2023-03-29 DIAGNOSIS — I10 PRIMARY HYPERTENSION: ICD-10-CM

## 2023-03-30 RX ORDER — TELMISARTAN 40 MG/1
TABLET ORAL
Qty: 30 TABLET | Refills: 2 | Status: SHIPPED | OUTPATIENT
Start: 2023-03-30

## 2023-03-30 RX ORDER — ATORVASTATIN CALCIUM 10 MG/1
TABLET, FILM COATED ORAL
Qty: 30 TABLET | Refills: 2 | Status: SHIPPED | OUTPATIENT
Start: 2023-03-30

## 2023-03-30 RX ORDER — GLIMEPIRIDE 1 MG/1
TABLET ORAL
Qty: 60 TABLET | Refills: 2 | Status: SHIPPED | OUTPATIENT
Start: 2023-03-30

## 2023-03-30 RX ORDER — METOPROLOL SUCCINATE 25 MG/1
TABLET, EXTENDED RELEASE ORAL
Qty: 30 TABLET | Refills: 2 | Status: SHIPPED | OUTPATIENT
Start: 2023-03-30

## 2023-04-25 RX ORDER — DAPAGLIFLOZIN 10 MG/1
TABLET, FILM COATED ORAL
Qty: 30 TABLET | Refills: 3 | Status: SHIPPED | OUTPATIENT
Start: 2023-04-25

## 2023-07-01 DIAGNOSIS — E78.5 DYSLIPIDEMIA: ICD-10-CM

## 2023-07-01 DIAGNOSIS — E11.9 TYPE 2 DIABETES MELLITUS WITHOUT COMPLICATION, WITHOUT LONG-TERM CURRENT USE OF INSULIN (HCC): ICD-10-CM

## 2023-07-01 DIAGNOSIS — I10 PRIMARY HYPERTENSION: ICD-10-CM

## 2023-07-03 ENCOUNTER — TELEPHONE (OUTPATIENT)
Dept: PRIMARY CARE CLINIC | Age: 43
End: 2023-07-03

## 2023-07-03 RX ORDER — GLIMEPIRIDE 1 MG/1
TABLET ORAL
Qty: 60 TABLET | Refills: 0 | OUTPATIENT
Start: 2023-07-03

## 2023-07-03 RX ORDER — TELMISARTAN 40 MG/1
TABLET ORAL
Qty: 30 TABLET | Refills: 0 | OUTPATIENT
Start: 2023-07-03

## 2023-07-03 RX ORDER — GLIMEPIRIDE 1 MG/1
1 TABLET ORAL 2 TIMES DAILY WITH MEALS
Qty: 60 TABLET | Refills: 0 | Status: SHIPPED | OUTPATIENT
Start: 2023-07-03

## 2023-07-03 RX ORDER — ATORVASTATIN CALCIUM 10 MG/1
TABLET, FILM COATED ORAL
Qty: 30 TABLET | Refills: 0 | OUTPATIENT
Start: 2023-07-03

## 2023-07-03 RX ORDER — METOPROLOL SUCCINATE 25 MG/1
25 TABLET, EXTENDED RELEASE ORAL DAILY
Qty: 30 TABLET | Refills: 0 | Status: SHIPPED | OUTPATIENT
Start: 2023-07-03

## 2023-07-03 RX ORDER — TELMISARTAN 40 MG/1
40 TABLET ORAL DAILY
Qty: 30 TABLET | Refills: 0 | Status: SHIPPED | OUTPATIENT
Start: 2023-07-03

## 2023-07-03 RX ORDER — ATORVASTATIN CALCIUM 10 MG/1
10 TABLET, FILM COATED ORAL DAILY
Qty: 30 TABLET | Refills: 0 | Status: SHIPPED | OUTPATIENT
Start: 2023-07-03

## 2023-07-03 RX ORDER — METOPROLOL SUCCINATE 25 MG/1
TABLET, EXTENDED RELEASE ORAL
Qty: 30 TABLET | Refills: 0 | OUTPATIENT
Start: 2023-07-03

## 2023-07-03 NOTE — TELEPHONE ENCOUNTER
Patient requesting a medication refill. Medication metformin   Dosage 500 mg tab   Frequency Take 1 tab PO twice daily with meals   [address]  Next office visit none     Patient requesting a medication refill. Medication Glimepiride   Dosage 1 mg tab   Frequency Take 1 tab PO twice daily with meals   [address]akota [address]  Next office visit none       Patient requesting a medication refill. Medication metoprolol er   Dosage  25 mg tab   Frequency Take 1 tab PO once daily   [address]akota [address]  Next office visit none      Patient requesting a medication refill. Medication atorvastatin   Dosage 10 mg tab   Frequency Take 1 tab PO once daily   [address]akota [address]  Next office visit none       Patient requesting a medication refill.     Medication Telmisartan  Dosage  40 mg tab   Frequency Take 1 tab PO once daily   [address]ota [address]  Next office visit none

## 2023-07-03 NOTE — TELEPHONE ENCOUNTER
Medication:   Requested Prescriptions     Pending Prescriptions Disp Refills    metFORMIN (GLUCOPHAGE) 500 MG tablet 60 tablet 2     Sig: Take 1 tablet by mouth 2 times daily (with meals)    glimepiride (AMARYL) 1 MG tablet 60 tablet 2     Sig: Take 1 tablet by mouth 2 times daily (with meals)    atorvastatin (LIPITOR) 10 MG tablet 30 tablet 2     Sig: Take 1 tablet by mouth daily    metoprolol succinate (TOPROL XL) 25 MG extended release tablet 30 tablet 2     Sig: Take 1 tablet by mouth daily    telmisartan (MICARDIS) 40 MG tablet 30 tablet 2     Sig: Take 1 tablet by mouth daily       Last Filled:      Patient Phone Number: [phone] (home)     Last appt: 5/24/2022   Next appt: Visit date not found    Last Labs DM: No results found for: VITD25    Last OARRS: No flowsheet data found. Preferred Pharmacy:   [facility], [address] [place] [address]  Phone: [phone] Fax: [phone]  Medication:   Requested Prescriptions     Pending Prescriptions Disp Refills    metFORMIN (GLUCOPHAGE) 500 MG tablet 60 tablet 2     Sig: Take 1 tablet by mouth 2 times daily (with meals)    glimepiride (AMARYL) 1 MG tablet 60 tablet 2     Sig: Take 1 tablet by mouth 2 times daily (with meals)    atorvastatin (LIPITOR) 10 MG tablet 30 tablet 2     Sig: Take 1 tablet by mouth daily    metoprolol succinate (TOPROL XL) 25 MG extended release tablet 30 tablet 2     Sig: Take 1 tablet by mouth daily    telmisartan (MICARDIS) 40 MG tablet 30 tablet 2     Sig: Take 1 tablet by mouth daily       Last Filled:      Patient Phone Number: [phone] (home)     Last appt: 5/24/2022   Next appt: Visit date not found    Last Labs DM: No results found for: VITD25    Last OARRS: No flowsheet data found.     Preferred Pharmacy:   [facility], [address] [place] [address]  Phone:

## 2023-07-03 NOTE — TELEPHONE ENCOUNTER
Rx denied- pt is due for a office visit. Spoke to pt and he stated that he will get an appointment scheduled online.  Pt has a refill a the pharmacy as well

## 2023-07-03 NOTE — TELEPHONE ENCOUNTER
Medication:   Requested Prescriptions     Pending Prescriptions Disp Refills    dapagliflozin (FARXIGA) 10 MG tablet 30 tablet 3     Sig: Take 1 tablet by mouth daily       Last Filled: 04/25/2023 #30 with 3 refills     Patient Phone Number: [phone] (home)     Last appt: 1/31/2023   Next appt: Visit date not found    Last Labs DM:   Lab Results   Component Value Date/Time    LABA1C 7.4 01/31/2023 10:14 AM

## 2023-07-03 NOTE — TELEPHONE ENCOUNTER
Rx medications have been denied- pt needs an appointment. Pt has been notified and stated that he will schedule an appointment online.

## 2023-07-17 ENCOUNTER — TELEPHONE (OUTPATIENT)
Dept: PRIMARY CARE CLINIC | Age: 43
End: 2023-07-17

## 2023-07-17 NOTE — TELEPHONE ENCOUNTER
Patient requesting a medication refill.     Medication Farxiga   Dosage 10 mg tab   Frequency  [facility]   Next office visit none

## 2023-07-17 NOTE — TELEPHONE ENCOUNTER
Medication:   Requested Prescriptions     Pending Prescriptions Disp Refills    dapagliflozin (FARXIGA) 10 MG tablet 90 tablet 0     Sig: Take 1 tablet by mouth daily       Last Filled: 04/25/2023 #30 with 3 refills     Patient Phone Number: [phone] (home)     Last appt: 1/31/2023   Next appt: Return in about 3 months (around 4/30/2023).         Last Labs DM:   Lab Results   Component Value Date/Time    LABA1C 7.4 01/31/2023 10:14 AM

## 2023-07-17 NOTE — TELEPHONE ENCOUNTER
Rx has been denied- pt needs an appointment. Spoke to pt and he stated that he will schedule an appointment online.      No further action is needed for this encounter

## 2023-07-31 DIAGNOSIS — I10 PRIMARY HYPERTENSION: ICD-10-CM

## 2023-07-31 DIAGNOSIS — E11.9 TYPE 2 DIABETES MELLITUS WITHOUT COMPLICATION, WITHOUT LONG-TERM CURRENT USE OF INSULIN (HCC): ICD-10-CM

## 2023-07-31 DIAGNOSIS — E78.5 DYSLIPIDEMIA: ICD-10-CM

## 2023-08-01 RX ORDER — METOPROLOL SUCCINATE 25 MG/1
TABLET, EXTENDED RELEASE ORAL
Qty: 30 TABLET | Refills: 0 | OUTPATIENT
Start: 2023-08-01

## 2023-08-01 RX ORDER — ATORVASTATIN CALCIUM 10 MG/1
TABLET, FILM COATED ORAL
Qty: 30 TABLET | Refills: 0 | OUTPATIENT
Start: 2023-08-01

## 2023-08-01 RX ORDER — TELMISARTAN 40 MG/1
TABLET ORAL
Qty: 30 TABLET | Refills: 0 | OUTPATIENT
Start: 2023-08-01

## 2023-08-01 RX ORDER — GLIMEPIRIDE 1 MG/1
TABLET ORAL
Qty: 60 TABLET | Refills: 0 | OUTPATIENT
Start: 2023-08-01

## 2023-08-09 DIAGNOSIS — E11.9 TYPE 2 DIABETES MELLITUS WITHOUT COMPLICATION, WITHOUT LONG-TERM CURRENT USE OF INSULIN (HCC): ICD-10-CM

## 2023-08-09 DIAGNOSIS — E78.5 DYSLIPIDEMIA: ICD-10-CM

## 2023-08-09 DIAGNOSIS — I10 PRIMARY HYPERTENSION: ICD-10-CM

## 2023-08-10 RX ORDER — METOPROLOL SUCCINATE 25 MG/1
TABLET, EXTENDED RELEASE ORAL
Qty: 30 TABLET | Refills: 1 | Status: SHIPPED | OUTPATIENT
Start: 2023-08-10

## 2023-08-10 RX ORDER — GLIMEPIRIDE 1 MG/1
TABLET ORAL
Qty: 60 TABLET | Refills: 1 | Status: SHIPPED | OUTPATIENT
Start: 2023-08-10

## 2023-08-10 RX ORDER — ATORVASTATIN CALCIUM 10 MG/1
TABLET, FILM COATED ORAL
Qty: 30 TABLET | Refills: 1 | Status: SHIPPED | OUTPATIENT
Start: 2023-08-10

## 2023-08-10 RX ORDER — TELMISARTAN 40 MG/1
TABLET ORAL
Qty: 30 TABLET | Refills: 1 | Status: SHIPPED | OUTPATIENT
Start: 2023-08-10

## 2023-11-03 DIAGNOSIS — E78.5 DYSLIPIDEMIA: ICD-10-CM

## 2023-11-03 NOTE — TELEPHONE ENCOUNTER
Medication:   Requested Prescriptions     Pending Prescriptions Disp Refills    atorvastatin (LIPITOR) 10 MG tablet [Pharmacy Med Name: Atorvastatin Calcium 10 MG Oral Tablet] 30 tablet 0     Sig: Take 1 tablet by mouth once daily     Last Filled: 8/10/2023     Last appt: 5/24/2022   Next appt: Visit date not found    Last Lipid:   Lab Results   Component Value Date/Time    CHOL 145 05/24/2022 08:51 AM    TRIG 184 05/24/2022 08:51 AM    HDL 40 05/24/2022 08:51 AM    LDLCALC 68 05/24/2022 08:51 AM

## 2023-11-04 RX ORDER — ATORVASTATIN CALCIUM 10 MG/1
TABLET, FILM COATED ORAL
Qty: 30 TABLET | Refills: 1 | Status: SHIPPED | OUTPATIENT
Start: 2023-11-04

## 2023-11-07 NOTE — TELEPHONE ENCOUNTER
Called to sched appt, pt stated he is out of town for work and will be back in town in a couple of months and will schedule an appt then